# Patient Record
Sex: FEMALE | Race: WHITE | NOT HISPANIC OR LATINO | Employment: FULL TIME | RURAL
[De-identification: names, ages, dates, MRNs, and addresses within clinical notes are randomized per-mention and may not be internally consistent; named-entity substitution may affect disease eponyms.]

---

## 2024-03-12 ENCOUNTER — OFFICE VISIT (OUTPATIENT)
Dept: FAMILY MEDICINE CLINIC | Facility: CLINIC | Age: 24
End: 2024-03-12
Payer: COMMERCIAL

## 2024-03-12 VITALS
WEIGHT: 270.4 LBS | HEART RATE: 96 BPM | BODY MASS INDEX: 46.16 KG/M2 | OXYGEN SATURATION: 98 % | HEIGHT: 64 IN | SYSTOLIC BLOOD PRESSURE: 126 MMHG | DIASTOLIC BLOOD PRESSURE: 86 MMHG

## 2024-03-12 DIAGNOSIS — Z13.1 SCREENING FOR DIABETES MELLITUS (DM): ICD-10-CM

## 2024-03-12 DIAGNOSIS — B37.2 YEAST DERMATITIS: ICD-10-CM

## 2024-03-12 DIAGNOSIS — Z13.220 SCREENING FOR LIPID DISORDERS: ICD-10-CM

## 2024-03-12 DIAGNOSIS — N62 LARGE BREASTS: ICD-10-CM

## 2024-03-12 DIAGNOSIS — G56.03 BILATERAL CARPAL TUNNEL SYNDROME: ICD-10-CM

## 2024-03-12 DIAGNOSIS — J45.20 MILD INTERMITTENT ASTHMA WITHOUT COMPLICATION: ICD-10-CM

## 2024-03-12 DIAGNOSIS — Z00.00 ANNUAL PHYSICAL EXAM: Primary | ICD-10-CM

## 2024-03-12 DIAGNOSIS — Z13.21 ENCOUNTER FOR VITAMIN DEFICIENCY SCREENING: ICD-10-CM

## 2024-03-12 RX ORDER — NYSTATIN 100000 [USP'U]/G
POWDER TOPICAL 3 TIMES DAILY
Qty: 60 G | Refills: 2 | Status: SHIPPED | OUTPATIENT
Start: 2024-03-12

## 2024-03-12 RX ORDER — ALBUTEROL SULFATE 90 UG/1
2 AEROSOL, METERED RESPIRATORY (INHALATION) EVERY 4 HOURS PRN
Qty: 18 G | Refills: 1 | Status: SHIPPED | OUTPATIENT
Start: 2024-03-12

## 2024-03-12 RX ORDER — NYSTATIN 100000 U/G
1 CREAM TOPICAL 2 TIMES DAILY
Qty: 30 G | Refills: 3 | Status: SHIPPED | OUTPATIENT
Start: 2024-03-12

## 2024-03-12 RX ORDER — NORGESTIMATE AND ETHINYL ESTRADIOL 0.25-0.035
1 KIT ORAL DAILY
COMMUNITY
Start: 2023-11-20

## 2024-03-12 NOTE — PROGRESS NOTES
"    Annual Physical     Name: Michaelle Bautista    : 2000     MRN: [id number]     Chief Complaint  Back Pain (Pt sttaes back pain going on since last week, She had hit her lower back on a window and had casued a knot. Pt is wanting to get it checked out to make sure everything is fine), Breast Problem (Pt is wanting to look into breast reduction ), Hand Pain (Pt has carpal tunnel in her right hand and occasionally left. Pt states it wakes her up at night from pain as well as hurting while driving. Pt states its gotten worse in past months ), and Asthma (Pt is needing refill on inhaler )    Subjective     History of Present Illness:  Michaelle Bautista is a 23 y.o. female who presents today for a health maintenance evaluation.    Social History  Tobacco Use: Low Risk  (3/12/2024)    Patient History     Smoking Tobacco Use: Never     Smokeless Tobacco Use: Never     Passive Exposure: Not on file     Social History     Socioeconomic History    Marital status:    Tobacco Use    Smoking status: Never    Smokeless tobacco: Never   Vaping Use    Vaping status: Never Used   Substance and Sexual Activity    Alcohol use: Yes     Alcohol/week: 1.0 - 2.0 standard drink of alcohol     Types: 1 - 2 Drinks containing 0.5 oz of alcohol per week    Drug use: Never    Sexual activity: Yes     Partners: Male     Birth control/protection: Condom, Birth control pill       General History  Michaelle  does have regular dental visits.  She does not complain of vision problems. Last eye exam was . Currently wearing glasses  Immunizations are not up to date. The patient needs the following immunizations: COVID and Influenza - declines today    Lifestyle  Michaelle  consumes in general, a \"healthy\" diet    She exercises rarely. Does play outside with her  students    Reproductive Health  Michaelle  is premenopausal.  She reports periods are irregular, she is on OCPs, has not had a period since December. She took a pregnancy test this " morning and it was negative  She is sexually active. Her contraceptive plan is oral contraceptives (estrogen/progesterone).    Screening  Last pap was November 2023  Last Completed Pap Smear       This patient has no relevant Health Maintenance data.        . History of abnormal pap smear or family history of gyn cancer: none    Last mammogram was never  Last Completed Mammogram       This patient has no relevant Health Maintenance data.        . Personal or family history of abnormal mammograms or breast cancer: maternal great aunt passed away with breast CA, no other fam hx    Last colonoscopy was never  Last Completed Colonoscopy       This patient has no relevant Health Maintenance data.        . Family history of colon cancer: none    Last DEXA was never.    Health Maintenance Summary            Overdue - BMI FOLLOWUP (Yearly) Never done      No completion, postpone, or frequency change history exists for this topic.              Overdue - Pneumococcal Vaccine 0-64 (1 of 2 - PCV) Never done      No completion, postpone, or frequency change history exists for this topic.              Overdue - HPV VACCINES (1 - 3-dose series) Never done      No completion, postpone, or frequency change history exists for this topic.              Overdue - TDAP/TD VACCINES (1 - Tdap) Never done      No completion, postpone, or frequency change history exists for this topic.              Overdue - INFLUENZA VACCINE (Yearly - August to March) Never done      No completion, postpone, or frequency change history exists for this topic.              Overdue - COVID-19 Vaccine (1 - 2023-24 season) Never done      No completion, postpone, or frequency change history exists for this topic.              Overdue - HEPATITIS C SCREENING (Once) Never done      No completion, postpone, or frequency change history exists for this topic.              Overdue - ANNUAL PHYSICAL (Yearly) Never done      No completion, postpone, or frequency change  "history exists for this topic.              Overdue - CHLAMYDIA SCREENING (Yearly) Never done      No completion, postpone, or frequency change history exists for this topic.              Overdue - PAP SMEAR (Every 3 Years) Never done      No completion, postpone, or frequency change history exists for this topic.              MENINGOCOCCAL VACCINE (Series Information) Aged Out      No completion, postpone, or frequency change history exists for this topic.                    There is no immunization history on file for this patient.    Review of Systems    Objective     Vital Signs  /86 (BP Location: Left arm, Patient Position: Sitting, Cuff Size: Adult)   Pulse 96   Ht 162.6 cm (64\")   Wt 123 kg (270 lb 6.4 oz)   SpO2 98%   BMI 46.41 kg/m²   Estimated body mass index is 46.41 kg/m² as calculated from the following:    Height as of this encounter: 162.6 cm (64\").    Weight as of this encounter: 123 kg (270 lb 6.4 oz).    Class 3 Severe Obesity (BMI >=40). Obesity-related health conditions include the following: none. Obesity is unchanged. BMI is is above average; BMI management plan is completed. We discussed portion control and increasing exercise.      Physical Exam  Vitals reviewed.   Constitutional:       Appearance: Normal appearance.   HENT:      Head: Normocephalic.      Right Ear: Tympanic membrane, ear canal and external ear normal.      Left Ear: Tympanic membrane, ear canal and external ear normal.      Nose: Nose normal.      Mouth/Throat:      Mouth: Mucous membranes are moist.      Pharynx: Oropharynx is clear.   Eyes:      Extraocular Movements: Extraocular movements intact.      Pupils: Pupils are equal, round, and reactive to light.   Cardiovascular:      Rate and Rhythm: Normal rate and regular rhythm.      Heart sounds: Normal heart sounds.   Pulmonary:      Effort: Pulmonary effort is normal.      Breath sounds: Normal breath sounds.   Chest:      Comments: Patient noted to have " excessively large breasts  Abdominal:      General: Bowel sounds are normal.      Palpations: Abdomen is soft.   Musculoskeletal:         General: Normal range of motion.      Cervical back: Normal range of motion.   Skin:     General: Skin is warm and dry.      Capillary Refill: Capillary refill takes less than 2 seconds.   Neurological:      General: No focal deficit present.      Mental Status: She is alert and oriented to person, place, and time.   Psychiatric:         Mood and Affect: Mood normal.         Behavior: Behavior normal.          Assessment and Plan     Diagnoses and all orders for this visit:    1. Annual physical exam (Primary)  Assessment & Plan:  Overall doing well  , no children yet  Works in [place] as a     Orders:  -     CBC & Differential  -     Comprehensive Metabolic Panel  -     Hemoglobin A1c  -     Lipid Panel  -     TSH  -     Vitamin B12  -     Vitamin D,25-Hydroxy  -     Folate  -     T4, free    2. Mild intermittent asthma without complication  Assessment & Plan:  Reports she is stable, in need of albuterol inhaler refill.    Orders:  -     albuterol sulfate  (90 Base) MCG/ACT inhaler; Inhale 2 puffs Every 4 (Four) Hours As Needed for Shortness of Air.  Dispense: 18 g; Refill: 1    3. Bilateral carpal tunnel syndrome  Assessment & Plan:  Patient reports she has been having carpal tunnel symptoms for about a year. She reports symptoms occur more frequently in right hand compared to left, but definitely happens in both hands. She has a cockup brace that she wears at night intermittently but reports it only helps a little bit. Otherwise she takes no medications for symptom relief.    We discussed treatment options. Encouraged getting a new set of cockup wrist splints to be worn consistently. Also discussed and encouraged ice and NSAID use as needed. Will follow for now, may require specialist evaluation if not improving      4. Large breasts  Assessment  "& Plan:  Patient reports she has had large breasts since she went through puberty. She isn't even sure what cup size she is. She complains of constant back pain, difficulty leaning forward to due anything due to pain and pulling sensation she experiences in her back due to weight of breasts. She reports she gets recurrent rashes underneath her breasts from heat and sweatiness - has pictures for proof.     She is interested in speaking with a surgeon about possible breast reduction surgery.     Orders:  -     Ambulatory Referral to General Surgery    5. Yeast dermatitis  Assessment & Plan:  Patient reports she has recurrent, red, itchy rash under her breasts. She reports area stays moist due to size of breasts. She reports the rash is occasionally itchy but then burns when its touched. She tries to keep the area as dry as possible. Doesn't use any preventative medications, uses \"random creams\" when she has the rash.    Orders:  -     nystatin (MYCOSTATIN) 537708 UNIT/GM powder; Apply  topically to the appropriate area as directed 3 (Three) Times a Day.  Dispense: 60 g; Refill: 2  -     nystatin (MYCOSTATIN) 882625 UNIT/GM cream; Apply 1 Application topically to the appropriate area as directed 2 (Two) Times a Day.  Dispense: 30 g; Refill: 3    6. Screening for lipid disorders  Assessment & Plan:  Labs drawn today    Orders:  -     Lipid Panel    7. Screening for diabetes mellitus (DM)  Assessment & Plan:  Labs drawn today    Orders:  -     Comprehensive Metabolic Panel  -     Hemoglobin A1c    8. Encounter for vitamin deficiency screening  Assessment & Plan:  Labs drawn today    Orders:  -     TSH  -     Vitamin B12  -     Vitamin D,25-Hydroxy        Follow Up  Return in about 1 year (around 3/12/2025) for Annual physical.    Aida Verdugo, KHUSHBU  "

## 2024-03-12 NOTE — ASSESSMENT & PLAN NOTE
Patient reports she has been having carpal tunnel symptoms for about a year. She reports symptoms occur more frequently in right hand compared to left, but definitely happens in both hands. She has a cockup brace that she wears at night intermittently but reports it only helps a little bit. Otherwise she takes no medications for symptom relief.    We discussed treatment options. Encouraged getting a new set of cockup wrist splints to be worn consistently. Also discussed and encouraged ice and NSAID use as needed. Will follow for now, may require specialist evaluation if not improving

## 2024-03-12 NOTE — ASSESSMENT & PLAN NOTE
Patient reports she has had large breasts since she went through puberty. She isn't even sure what cup size she is. She complains of constant back pain, difficulty leaning forward to due anything due to pain and pulling sensation she experiences in her back due to weight of breasts. She reports she gets recurrent rashes underneath her breasts from heat and sweatiness - has pictures for proof.     She is interested in speaking with a surgeon about possible breast reduction surgery.

## 2024-03-13 LAB
25(OH)D3+25(OH)D2 SERPL-MCNC: 11 NG/ML (ref 30–100)
ALBUMIN SERPL-MCNC: 4.5 G/DL (ref 4–5)
ALBUMIN/GLOB SERPL: 1.7 {RATIO} (ref 1.2–2.2)
ALP SERPL-CCNC: 78 IU/L (ref 44–121)
ALT SERPL-CCNC: 24 IU/L (ref 0–32)
AST SERPL-CCNC: 21 IU/L (ref 0–40)
BASOPHILS # BLD AUTO: 0 X10E3/UL (ref 0–0.2)
BASOPHILS NFR BLD AUTO: 1 %
BILIRUB SERPL-MCNC: 0.4 MG/DL (ref 0–1.2)
BUN SERPL-MCNC: 9 MG/DL (ref 6–20)
BUN/CREAT SERPL: 13 (ref 9–23)
CALCIUM SERPL-MCNC: 9.7 MG/DL (ref 8.7–10.2)
CHLORIDE SERPL-SCNC: 102 MMOL/L (ref 96–106)
CHOLEST SERPL-MCNC: 144 MG/DL (ref 100–199)
CO2 SERPL-SCNC: 22 MMOL/L (ref 20–29)
CREAT SERPL-MCNC: 0.72 MG/DL (ref 0.57–1)
EGFRCR SERPLBLD CKD-EPI 2021: 122 ML/MIN/1.73
EOSINOPHIL # BLD AUTO: 0.1 X10E3/UL (ref 0–0.4)
EOSINOPHIL NFR BLD AUTO: 1 %
ERYTHROCYTE [DISTWIDTH] IN BLOOD BY AUTOMATED COUNT: 13.8 % (ref 11.7–15.4)
FOLATE SERPL-MCNC: 9.3 NG/ML
GLOBULIN SER CALC-MCNC: 2.7 G/DL (ref 1.5–4.5)
GLUCOSE SERPL-MCNC: 80 MG/DL (ref 70–99)
HBA1C MFR BLD: 5.4 % (ref 4.8–5.6)
HCT VFR BLD AUTO: 38.7 % (ref 34–46.6)
HDLC SERPL-MCNC: 37 MG/DL
HGB BLD-MCNC: 13 G/DL (ref 11.1–15.9)
IMM GRANULOCYTES # BLD AUTO: 0 X10E3/UL (ref 0–0.1)
IMM GRANULOCYTES NFR BLD AUTO: 0 %
LDLC SERPL CALC-MCNC: 92 MG/DL (ref 0–99)
LYMPHOCYTES # BLD AUTO: 2.1 X10E3/UL (ref 0.7–3.1)
LYMPHOCYTES NFR BLD AUTO: 25 %
MCH RBC QN AUTO: 29.1 PG (ref 26.6–33)
MCHC RBC AUTO-ENTMCNC: 33.6 G/DL (ref 31.5–35.7)
MCV RBC AUTO: 87 FL (ref 79–97)
MONOCYTES # BLD AUTO: 0.5 X10E3/UL (ref 0.1–0.9)
MONOCYTES NFR BLD AUTO: 6 %
NEUTROPHILS # BLD AUTO: 5.7 X10E3/UL (ref 1.4–7)
NEUTROPHILS NFR BLD AUTO: 67 %
PLATELET # BLD AUTO: 300 X10E3/UL (ref 150–450)
POTASSIUM SERPL-SCNC: 4.4 MMOL/L (ref 3.5–5.2)
PROT SERPL-MCNC: 7.2 G/DL (ref 6–8.5)
RBC # BLD AUTO: 4.46 X10E6/UL (ref 3.77–5.28)
SODIUM SERPL-SCNC: 140 MMOL/L (ref 134–144)
T4 FREE SERPL-MCNC: 1.06 NG/DL (ref 0.82–1.77)
TRIGL SERPL-MCNC: 74 MG/DL (ref 0–149)
TSH SERPL DL<=0.005 MIU/L-ACNC: 1.52 UIU/ML (ref 0.45–4.5)
VIT B12 SERPL-MCNC: 490 PG/ML (ref 232–1245)
VLDLC SERPL CALC-MCNC: 15 MG/DL (ref 5–40)
WBC # BLD AUTO: 8.5 X10E3/UL (ref 3.4–10.8)

## 2024-03-14 ENCOUNTER — TELEPHONE (OUTPATIENT)
Dept: FAMILY MEDICINE CLINIC | Facility: CLINIC | Age: 24
End: 2024-03-14
Payer: COMMERCIAL

## 2024-03-14 NOTE — ASSESSMENT & PLAN NOTE
"Patient reports she has recurrent, red, itchy rash under her breasts. She reports area stays moist due to size of breasts. She reports the rash is occasionally itchy but then burns when its touched. She tries to keep the area as dry as possible. Doesn't use any preventative medications, uses \"random creams\" when she has the rash.  "

## 2024-03-14 NOTE — TELEPHONE ENCOUNTER
"Relay     \"Dr GRACE does not see for breast reductions. Would need to go to [facility] and wanted to know if that was ok?\"                "

## 2024-03-15 ENCOUNTER — TELEPHONE (OUTPATIENT)
Dept: FAMILY MEDICINE CLINIC | Facility: CLINIC | Age: 24
End: 2024-03-15
Payer: COMMERCIAL

## 2024-03-15 DIAGNOSIS — E55.9 VITAMIN D DEFICIENCY: Primary | ICD-10-CM

## 2024-03-15 NOTE — TELEPHONE ENCOUNTER
" Hub to relay   Please let Cheli know we got her lab results back.     Her cholesterol levels overall look good. Her \"good\" cholesterol is a little low at 37, we like to see this be 40 or higher. I would recommend that she include some healthy fats into her diet - olive oil, avocado, whole grains, nuts, seeds, etc. Exercising is also a huge help with this.     Her vitamin D is pretty low - I've sent a Rx to her pharmacy for a supplement.     The rest of her labs look great! Blood counts, electrolytes, liver, kidneys, thyroid, HgA1c, B12, and folate are all normal  "

## 2024-04-24 ENCOUNTER — OFFICE VISIT (OUTPATIENT)
Dept: FAMILY MEDICINE CLINIC | Facility: CLINIC | Age: 24
End: 2024-04-24
Payer: COMMERCIAL

## 2024-04-24 VITALS
TEMPERATURE: 98.7 F | WEIGHT: 271 LBS | OXYGEN SATURATION: 98 % | HEIGHT: 64 IN | BODY MASS INDEX: 46.26 KG/M2 | DIASTOLIC BLOOD PRESSURE: 82 MMHG | SYSTOLIC BLOOD PRESSURE: 120 MMHG | HEART RATE: 89 BPM

## 2024-04-24 DIAGNOSIS — J30.89 ENVIRONMENTAL AND SEASONAL ALLERGIES: Primary | ICD-10-CM

## 2024-04-24 DIAGNOSIS — R09.81 NASAL CONGESTION: ICD-10-CM

## 2024-04-24 DIAGNOSIS — J02.9 SORE THROAT: ICD-10-CM

## 2024-04-24 LAB
EXPIRATION DATE: NORMAL
EXPIRATION DATE: NORMAL
FLUAV AG UPPER RESP QL IA.RAPID: NOT DETECTED
FLUBV AG UPPER RESP QL IA.RAPID: NOT DETECTED
INTERNAL CONTROL: NORMAL
INTERNAL CONTROL: NORMAL
Lab: NORMAL
Lab: NORMAL
S PYO AG THROAT QL: NEGATIVE
SARS-COV-2 AG UPPER RESP QL IA.RAPID: NOT DETECTED

## 2024-04-24 PROCEDURE — 87428 SARSCOV & INF VIR A&B AG IA: CPT | Performed by: NURSE PRACTITIONER

## 2024-04-24 PROCEDURE — 99213 OFFICE O/P EST LOW 20 MIN: CPT | Performed by: NURSE PRACTITIONER

## 2024-04-24 PROCEDURE — 87880 STREP A ASSAY W/OPTIC: CPT | Performed by: NURSE PRACTITIONER

## 2024-04-24 RX ORDER — LORATADINE 10 MG/1
10 TABLET ORAL DAILY
Qty: 30 TABLET | Refills: 2 | Status: SHIPPED | OUTPATIENT
Start: 2024-04-24

## 2024-04-24 RX ORDER — AZELASTINE 1 MG/ML
2 SPRAY, METERED NASAL 2 TIMES DAILY
Qty: 30 ML | Refills: 1 | Status: SHIPPED | OUTPATIENT
Start: 2024-04-24

## 2024-04-24 NOTE — PROGRESS NOTES
"    Office Note     Name: Michaelle Bautista    : 2000     MRN: [id number]     Chief Complaint  Sore Throat (Pt has had a sore throat over a couple days. Had been near someone with strep )    Subjective     History of Present Illness:  Michaelle Bautista is a 23 y.o. female who presents today for sore throat and nasal congestion since yesterday.       Objective     Past Medical History:   Diagnosis Date    ADHD (attention deficit hyperactivity disorder)     Asthma      History reviewed. No pertinent surgical history.  History reviewed. No pertinent family history.    Vital Signs  /82 (BP Location: Left arm, Patient Position: Sitting, Cuff Size: Adult)   Pulse 89   Temp 98.7 °F (37.1 °C) (Oral)   Ht 162.6 cm (64\")   Wt 123 kg (271 lb)   SpO2 98%   BMI 46.52 kg/m²   Estimated body mass index is 46.52 kg/m² as calculated from the following:    Height as of this encounter: 162.6 cm (64\").    Weight as of this encounter: 123 kg (271 lb).    Physical Exam  Vitals reviewed.   Constitutional:       Appearance: Normal appearance.   HENT:      Head: Normocephalic.      Right Ear: Tympanic membrane, ear canal and external ear normal.      Left Ear: Tympanic membrane, ear canal and external ear normal.      Nose: Congestion present.      Mouth/Throat:      Mouth: Mucous membranes are moist.      Pharynx: Posterior oropharyngeal erythema (very minimal erythema, no exudate, no vesicles) present.   Cardiovascular:      Rate and Rhythm: Normal rate and regular rhythm.      Heart sounds: Normal heart sounds.   Pulmonary:      Effort: Pulmonary effort is normal.      Breath sounds: Normal breath sounds.   Skin:     General: Skin is warm and dry.      Capillary Refill: Capillary refill takes less than 2 seconds.   Neurological:      General: No focal deficit present.      Mental Status: She is alert and oriented to person, place, and time.   Psychiatric:         Mood and Affect: Mood normal.         Behavior: Behavior " normal.          POCT Results (if applicable):  Results for orders placed or performed in visit on 04/24/24   POCT SARS-CoV-2 Antigen FABIANA + Flu    Specimen: Swab   Result Value Ref Range    SARS Antigen Not Detected Not Detected, Presumptive Negative    Influenza A Antigen FABIANA Not Detected Not Detected    Influenza B Antigen FABIANA Not Detected Not Detected    Internal Control Passed Passed    Lot Number [id number]     Expiration Date 02/05/2025    POCT rapid strep A    Specimen: Swab   Result Value Ref Range    Rapid Strep A Screen Negative Negative, VALID, INVALID, Not Performed    Internal Control Passed Passed    Lot Number [id number]     Expiration Date 10/01/2026             Assessment and Plan     Diagnoses and all orders for this visit:    1. Environmental and seasonal allergies (Primary)  Assessment & Plan:  COVID/Influenza/Strep tests all negative.     Symptoms consistent with and physical exam confirms allergy symptoms. We discussed treatment options, risks/benefits, and possible side effects. Will initiate Claritin and Astelin nasal spray and monitor for effect. Encouraged patient to return to the office if symptoms worsen or change.     Orders:  -     loratadine (Claritin) 10 MG tablet; Take 1 tablet by mouth Daily.  Dispense: 30 tablet; Refill: 2  -     azelastine (ASTELIN) 0.1 % nasal spray; 2 sprays into the nostril(s) as directed by provider 2 (Two) Times a Day. Use in each nostril as directed  Dispense: 30 mL; Refill: 1    2. Sore throat  Assessment & Plan:  Patient reports she has had nasal congestion and sore throat since yesterday. She denies cough, headache, fever, abdominal pain, N/V/D. She has not taken any medications to help with her symptoms. She has been exposed to strep.     Orders:  -     POCT SARS-CoV-2 Antigen FABIANA + Flu  -     POCT rapid strep A    3. Nasal congestion  Assessment & Plan:  Patient reports she has had nasal congestion and sore throat since yesterday. She denies cough,  headache, fever, abdominal pain, N/V/D. She has not taken any medications to help with her symptoms. She has been exposed to strep.            Follow Up  Return if symptoms worsen or fail to improve.    KHUSHBU Herrmann

## 2024-04-24 NOTE — ASSESSMENT & PLAN NOTE
COVID/Influenza/Strep tests all negative.     Symptoms consistent with and physical exam confirms allergy symptoms. We discussed treatment options, risks/benefits, and possible side effects. Will initiate Claritin and Astelin nasal spray and monitor for effect. Encouraged patient to return to the office if symptoms worsen or change.

## 2024-04-24 NOTE — ASSESSMENT & PLAN NOTE
Patient reports she has had nasal congestion and sore throat since yesterday. She denies cough, headache, fever, abdominal pain, N/V/D. She has not taken any medications to help with her symptoms. She has been exposed to strep.

## 2024-05-03 ENCOUNTER — TELEPHONE (OUTPATIENT)
Dept: FAMILY MEDICINE CLINIC | Facility: CLINIC | Age: 24
End: 2024-05-03
Payer: COMMERCIAL

## 2024-05-03 NOTE — TELEPHONE ENCOUNTER
Caller: Michaelle Bautista    Relationship: Self    Best call back number: [phone]    What form or medical record are you requesting: PT STATED THAT SHE IS HAVING BREAST REDUCTION AND WAS TOLD THAT SHE WILL NEED A LETTER FROM  STATING THAT REASONS FOR HER MEDICATIONS THAT SHE IS TAKING    How would you like to receive the form or medical records (pick-up, mail, fax): WILL  FROM OFFICE    Timeframe paperwork needed: ASAP

## 2024-05-14 NOTE — TELEPHONE ENCOUNTER
LVM for Pt to call us back regarding letter she is needing   Sending letter to pt in order to see

## 2024-06-10 ENCOUNTER — OFFICE VISIT (OUTPATIENT)
Dept: FAMILY MEDICINE CLINIC | Facility: CLINIC | Age: 24
End: 2024-06-10
Payer: COMMERCIAL

## 2024-06-10 VITALS
SYSTOLIC BLOOD PRESSURE: 126 MMHG | WEIGHT: 271.3 LBS | HEIGHT: 64 IN | HEART RATE: 89 BPM | BODY MASS INDEX: 46.32 KG/M2 | OXYGEN SATURATION: 98 % | DIASTOLIC BLOOD PRESSURE: 80 MMHG

## 2024-06-10 DIAGNOSIS — N62 LARGE BREASTS: ICD-10-CM

## 2024-06-10 DIAGNOSIS — B37.2 YEAST DERMATITIS: Primary | ICD-10-CM

## 2024-06-10 PROCEDURE — 99213 OFFICE O/P EST LOW 20 MIN: CPT | Performed by: NURSE PRACTITIONER

## 2024-06-10 NOTE — ASSESSMENT & PLAN NOTE
Had consultation with surgeon for breast reduction surgery about a month ago. They are requesting a letter from PCP stating the medical need for her nystatin for insurance purposes.   Letter written and provided to patient today.  Surgery date is pending.

## 2024-06-10 NOTE — LETTER
Delores 10, 2024    Michaelle Bautista  [address] [facility], [place] [address]        To Whom It May Concern:     Cheli Bautista ( 2000) is an establish patient in this office, and I am her primary care provider. She is being evaluated for breast reduction surgery and has completed a consultation with a surgeon. Cheli has excessively large breasts, currently wearing an F cup. She has associated neck and back pain due to the size and weight of her breasts. Her largest complaint, however, is the rashes that she tends to keep under her breasts.     She has been evaluated multiple times in this clinic and urgent care facilities due to these rashes. She has been found to have yeast dermatitis as a result of her breasts creating a warm, dark, and moist environment for yeast to thrive, despite her multiple attempts to keep the area clean and dry. She has used Nystatin powder for prevention, as well as using various cloths tucked under her breasts to keep the area dry. She has used Nystatin and Clotrimazole creams for treatment of her rash, neither of which have been 100% effective at treating due to the constant skin-on-skin contact with her breasts and abdomen.     Her breast size and recurrent rashes are causing a decline in her quality of life, and it is my professional opinion that she is a great candidate for breast reduction surgery.     For any further questions or concerns, please feel free to contact the office.     Thank you,           KHUSHBU Herrmann

## 2024-06-10 NOTE — ASSESSMENT & PLAN NOTE
Patient reports she has recurrent, red, itchy rash under her breasts. She reports area stays moist due to size of breasts. She reports the rash is occasionally itchy but then burns when its touched. She tries to keep the area as dry as possible, which is extremely difficult. She has been prescribed Nystatin powder for prevention and Nystatin cream for treatment of yeast dermatitis, this has been intermittently effective.

## 2024-06-13 NOTE — PROGRESS NOTES
"    Office Note     Name: Michaelle Bautista    : 2000     MRN: [id number]     Chief Complaint  Breast Problem (Pt is needing a letter stating why sh takes her medications in order to schedule breast reduction surgery )    Subjective     History of Present Illness:  Michaelle Bautista is a 23 y.o. female who presents today for letter explaining medical necessity for antifungal powder and cream.  Patient has had consultation with surgeon for breast reduction surgery.      Objective     Past Medical History:   Diagnosis Date    ADHD (attention deficit hyperactivity disorder)     Asthma      History reviewed. No pertinent surgical history.  History reviewed. No pertinent family history.    Vital Signs  /80 (BP Location: Left arm, Patient Position: Sitting)   Pulse 89   Ht 162.6 cm (64\")   Wt 123 kg (271 lb 4.8 oz)   SpO2 98%   BMI 46.57 kg/m²   Estimated body mass index is 46.57 kg/m² as calculated from the following:    Height as of this encounter: 162.6 cm (64\").    Weight as of this encounter: 123 kg (271 lb 4.8 oz).    Physical Exam  Vitals reviewed.   Constitutional:       Appearance: Normal appearance.   Cardiovascular:      Rate and Rhythm: Normal rate and regular rhythm.      Heart sounds: Normal heart sounds.   Pulmonary:      Effort: Pulmonary effort is normal.      Breath sounds: Normal breath sounds.   Skin:     General: Skin is warm and dry.      Capillary Refill: Capillary refill takes less than 2 seconds.   Neurological:      General: No focal deficit present.      Mental Status: She is alert and oriented to person, place, and time.   Psychiatric:         Mood and Affect: Mood normal.         Behavior: Behavior normal.           Assessment and Plan     Diagnoses and all orders for this visit:    1. Yeast dermatitis (Primary)  Assessment & Plan:  Patient reports she has recurrent, red, itchy rash under her breasts. She reports area stays moist due to size of breasts. She reports the rash is " occasionally itchy but then burns when its touched. She tries to keep the area as dry as possible, which is extremely difficult. She has been prescribed Nystatin powder for prevention and Nystatin cream for treatment of yeast dermatitis, this has been intermittently effective.       2. Large breasts  Assessment & Plan:  Had consultation with surgeon for breast reduction surgery about a month ago. They are requesting a letter from PCP stating the medical need for her nystatin for insurance purposes.   Letter written and provided to patient today.  Surgery date is pending.            Follow Up  Return for Next scheduled follow up.    KHUSHBU Herrmann

## 2025-01-16 ENCOUNTER — OFFICE VISIT (OUTPATIENT)
Dept: FAMILY MEDICINE CLINIC | Facility: CLINIC | Age: 25
End: 2025-01-16
Payer: COMMERCIAL

## 2025-01-16 VITALS
OXYGEN SATURATION: 98 % | DIASTOLIC BLOOD PRESSURE: 80 MMHG | SYSTOLIC BLOOD PRESSURE: 128 MMHG | WEIGHT: 260 LBS | HEART RATE: 83 BPM | HEIGHT: 64 IN | BODY MASS INDEX: 44.39 KG/M2

## 2025-01-16 DIAGNOSIS — M54.2 CHRONIC NECK PAIN: Primary | ICD-10-CM

## 2025-01-16 DIAGNOSIS — M79.672 HEEL PAIN, BILATERAL: ICD-10-CM

## 2025-01-16 DIAGNOSIS — G89.29 CHRONIC RIGHT SHOULDER PAIN: ICD-10-CM

## 2025-01-16 DIAGNOSIS — M79.671 HEEL PAIN, BILATERAL: ICD-10-CM

## 2025-01-16 DIAGNOSIS — G89.29 CHRONIC NECK PAIN: Primary | ICD-10-CM

## 2025-01-16 DIAGNOSIS — M79.671 BILATERAL FOOT PAIN: ICD-10-CM

## 2025-01-16 DIAGNOSIS — G89.29 CHRONIC BILATERAL THORACIC BACK PAIN: ICD-10-CM

## 2025-01-16 DIAGNOSIS — M54.6 CHRONIC BILATERAL THORACIC BACK PAIN: ICD-10-CM

## 2025-01-16 DIAGNOSIS — R06.83 LOUD SNORING: ICD-10-CM

## 2025-01-16 DIAGNOSIS — G47.30 SLEEP APNEA, UNSPECIFIED TYPE: ICD-10-CM

## 2025-01-16 DIAGNOSIS — M79.672 BILATERAL FOOT PAIN: ICD-10-CM

## 2025-01-16 DIAGNOSIS — N62 LARGE BREASTS: ICD-10-CM

## 2025-01-16 DIAGNOSIS — M25.511 CHRONIC RIGHT SHOULDER PAIN: ICD-10-CM

## 2025-01-16 PROCEDURE — 99214 OFFICE O/P EST MOD 30 MIN: CPT | Performed by: NURSE PRACTITIONER

## 2025-01-16 NOTE — PROGRESS NOTES
"    Office Note     Name: Michaelle Bautista    : 2000     MRN: [id number]     Chief Complaint  Back Pain, Pain (Pt states she has been having feet pain since high school ), Shoulder Pain (Pt states the last month her shoulder have been hurting ), and Snoring (Pt states her  has been noticing her snore loud at night and almost stop breathing )    Subjective     History of Present Illness:  Michaelle Bautista is a 24 y.o. female who presents today for multiple concerns.     History of Present Illness  The patient presents for evaluation of shoulder pain, back pain, foot pain, and suspected sleep apnea.    She reports experiencing severe pain in her right shoulder, which she describes as muscular in nature and accompanied by a stinging sensation. The pain is constant and has been progressively worsening. She has not sought relief through massage therapy or chiropractic adjustments. She does not experience any numbness or tingling in her arms or hands, except for symptoms related to carpal tunnel syndrome. Despite the pain, she retains mobility in her arm and shoulder. She has attempted to alleviate the discomfort with a \"massage gun\", heat, ice, and rest, but these measures only provide temporary relief for a few hours before the pain returns with full intensity.    She has been experiencing foot pain since high school, which she describes as particularly severe in the heels after a 7-hour work shift, necessitating immediate rest upon returning home. The pain is so intense that it impedes her ability to walk. She also reports occasional pain in the front part of her foot, although it is less frequent and severe than the heel pain. She has not undergone any radiographic examinations or surgical interventions for her feet. She typically wears tennis shoes with memory foam inserts for work and has observed that the middle part of the shoe (\"ball of the foot\") tends to wear out more quickly than the rest of the sole. " "She has tried hot baths and Epsom salt, which provide temporary relief, but the pain eventually returns with increased intensity.    She is concerned about potential sleep apnea, as her  has observed episodes of loud snoring and cessation of breathing during her sleep. Her mother has also reported similar observations.    She has been experiencing back pain, which she attributes to her large breasts. She had a consultation for breast reduction surgery in May 2024 but decided to postpone it until after having children due to concerns about breastfeeding. She has not had any x-rays of her neck or back.      Objective     Past Medical History:   Diagnosis Date    ADHD (attention deficit hyperactivity disorder)     Asthma      History reviewed. No pertinent surgical history.  History reviewed. No pertinent family history.    Vital Signs  /80 (BP Location: Left arm, Patient Position: Sitting)   Pulse 83   Ht 162.6 cm (64\")   Wt 118 kg (260 lb)   SpO2 98%   BMI 44.63 kg/m²   Estimated body mass index is 44.63 kg/m² as calculated from the following:    Height as of this encounter: 162.6 cm (64\").    Weight as of this encounter: 118 kg (260 lb).    Physical Exam  Vitals reviewed.   Constitutional:       Appearance: Normal appearance.   Cardiovascular:      Rate and Rhythm: Normal rate and regular rhythm.      Heart sounds: Normal heart sounds.   Pulmonary:      Effort: Pulmonary effort is normal.      Breath sounds: Normal breath sounds.   Musculoskeletal:      Right shoulder: Tenderness present. No swelling, bony tenderness or crepitus. Normal range of motion (but does c/o pain with ROM).      Left shoulder: No swelling, tenderness, bony tenderness or crepitus. Normal range of motion.        Arms:       Cervical back: No tenderness or bony tenderness. Normal range of motion.      Thoracic back: No tenderness or bony tenderness. Normal range of motion.   Skin:     General: Skin is warm and dry. " "  Neurological:      General: No focal deficit present.      Mental Status: She is alert and oriented to person, place, and time.   Psychiatric:         Mood and Affect: Mood normal.         Behavior: Behavior normal.        Physical Exam  Lungs were auscultated.    Results         Assessment and Plan     Diagnoses and all orders for this visit:    1. Chronic neck pain (Primary)  -     XR Spine Cervical 2 or 3 View    2. Chronic right shoulder pain  -     XR Shoulder 2+ View Right (In Office)    3. Chronic bilateral thoracic back pain  -     XR Spine Thoracic 2 View (In Office)    4. Large breasts  -     XR Spine Cervical 2 or 3 View  -     XR Spine Thoracic 2 View (In Office)  -     XR Shoulder 2+ View Right (In Office)    5. Loud snoring  -     Ambulatory Referral to Sleep Medicine    6. Sleep apnea, unspecified type  -     Ambulatory Referral to Sleep Medicine    7. Bilateral foot pain  -     XR Foot 3+ View Bilateral (In Office)    8. Heel pain, bilateral  -     XR Foot 3+ View Bilateral (In Office)      Assessment & Plan  1. Right shoulder pain.  The patient reports persistent right shoulder pain that feels like muscle pain and is accompanied by stiffness. She has tried a \"massage gun\", heat, ice, and resting, which provide only temporary relief. X-rays of the neck, thoracic spine, and shoulder will be ordered to rule out any alignment issues or bone spurs.    2. Back pain.  The patient has a history of thoracic back pain, which has been consistent and worsening. She previously considered breast reduction surgery due to the pain but decided to postpone it until after having children. X-rays of the neck and thoracic spine will be ordered to check for any underlying issues.    3. Foot pain.  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 She reports that the pain is severe enough to limit her mobility and that her heels become red. She occasionally " experiences pain in the front part of her foot but not as often or as severe as the heel pain. X-rays of both feet will be ordered to rule out heel spurs or other structural abnormalities.    4. Suspected sleep apnea.  The patient reports loud snoring and episodes of stopped breathing during sleep, as observed by her  and mother. A referral to a sleep specialist will be made for further evaluation and potential sleep study. The sleep study will likely be conducted at home, and the necessary equipment will be sent to her house.    Follow-up  The patient will follow up after 03/12/2025 for her physical examination.         Follow Up  Return in about 8 weeks (around 3/13/2025) for Annual physical with fasting labs.      Patient or patient representative verbalized consent for the use of Ambient Listening during the visit with  KHUSHBU Herrmann for chart documentation. 1/19/2025  13:32 EST    KHUSHBU Herrmann

## 2025-02-06 ENCOUNTER — OFFICE VISIT (OUTPATIENT)
Age: 25
End: 2025-02-06
Payer: COMMERCIAL

## 2025-02-06 ENCOUNTER — PATIENT ROUNDING (BHMG ONLY) (OUTPATIENT)
Dept: CARDIOLOGY | Facility: CLINIC | Age: 25
End: 2025-02-06
Payer: COMMERCIAL

## 2025-02-06 VITALS
DIASTOLIC BLOOD PRESSURE: 78 MMHG | BODY MASS INDEX: 44.61 KG/M2 | HEART RATE: 85 BPM | HEIGHT: 64 IN | SYSTOLIC BLOOD PRESSURE: 124 MMHG | WEIGHT: 261.3 LBS | OXYGEN SATURATION: 99 %

## 2025-02-06 DIAGNOSIS — F51.01 PRIMARY INSOMNIA: ICD-10-CM

## 2025-02-06 DIAGNOSIS — G47.10 HYPERSOMNIA: ICD-10-CM

## 2025-02-06 DIAGNOSIS — R06.83 SNORING: ICD-10-CM

## 2025-02-06 PROBLEM — J45.909 ASTHMA: Status: ACTIVE | Noted: 2024-03-12

## 2025-02-06 PROBLEM — L70.0 SUPERFICIAL MIXED COMEDONAL AND INFLAMMATORY ACNE VULGARIS: Status: ACTIVE | Noted: 2018-04-13

## 2025-02-06 NOTE — PROGRESS NOTES
"    [facility] Provider Note     Date:   2025   Name: Michaelle Bautista  :   2000  PCP: Aida Verdugo APRN         ..Establish Care (NECK SIZE: 16.5\"/EPWORTH: /PT REPORTS THAT HER MOM AND BOYFRIEND TELL HER SHE SNORES AND HAVE WITNESSED APNEAS. )       History of Present Illness    Michaelle Bautista is a 24 y.o. female who presents today to establish sleep care for possible sleep apnea.  She was referred by her primary care provider, Aida Verdugo, and I appreciate the referral.  Patient is complaining of snoring and occasionally stops breathing in her sleep.  She said this happens every night.  She denies having had any accidents at work due to sleepiness and she denies ever having had any traffic accidents due to sleepiness.  Her  told her that she snores almost continuously every night and in all positions.  Her  also told her that she quits breathing at night.  She will sometimes have a morning sore throat.  She has had her weight has remained stable over the past 5 years.  She will sometimes have periods when she feels paralyzed while going to sleep or waking up.  She thinks that she frequently kicks and jerks her legs at night.  She has difficulty initiating sleep at night sometimes.  And she will talk in her sleep.  She goes to bed between 11 PM and 12 AM and gets up around 8 AM.  It can take her 1 to 2 hours to go to sleep and she gets up 1 time a night.    No tobacco history.  She only drinks alcohol about once every 2 months on special occasions.  She does drink 1 regular coffee a day and 1 regular soda a day.  No history of recreational drug use.    Neck size 16.5 inches and [place] 12.    The only time she has chest discomfort is when she is having a mild asthma exacerbation and uses albuterol inhaler and it resolves.  Otherwise no chest pain or shortness of air.  No edema.  No palpitations.    We will order a home sleep study and then go from " there.      Reviewed: PCP note, new patient packet, [place]    Cardiology and Sleep Related History:    No specialty comments available.    Problems Addressed this Visit       Hypersomnia     Check home sleep study         Relevant Orders    Home Sleep Study    Snoring     Check home sleep study         Relevant Orders    Home Sleep Study    Primary insomnia     Takes her 1-2 hours to fall asleep.  Recheck after HST.          Diagnoses         Codes Comments    Hypersomnia     ICD-10-CM: G47.10  ICD-9-CM: 780.54     Snoring     ICD-10-CM: R06.83  ICD-9-CM: 786.09     Primary insomnia     ICD-10-CM: F51.01  ICD-9-CM: 307.42              There are no discontinued medications.       No Known Allergies      Current Outpatient Medications:     albuterol sulfate  (90 Base) MCG/ACT inhaler, Inhale 2 puffs Every 4 (Four) Hours As Needed for Shortness of Air., Disp: 18 g, Rfl: 1    nystatin (MYCOSTATIN) 698413 UNIT/GM cream, Apply 1 Application topically to the appropriate area as directed 2 (Two) Times a Day., Disp: 30 g, Rfl: 3    nystatin (MYCOSTATIN) 311085 UNIT/GM powder, Apply  topically to the appropriate area as directed 3 (Three) Times a Day., Disp: 60 g, Rfl: 2    Past Medical History:   Diagnosis Date    ADHD (attention deficit hyperactivity disorder)     Asthma 3/12/2024          Patient Active Problem List   Diagnosis    Annual physical exam    Bilateral carpal tunnel syndrome    Encounter for vitamin deficiency screening    Screening for lipid disorders    Screening for diabetes mellitus (DM)    Large breasts    Yeast dermatitis    Environmental and seasonal allergies    Sore throat    Nasal congestion    Superficial mixed comedonal and inflammatory acne vulgaris    Asthma    Hypersomnia    Snoring    Primary insomnia        History reviewed. No pertinent family history.      family history is not on file.     Social History     Socioeconomic History    Marital status:    Tobacco Use    Smoking  "status: Never    Smokeless tobacco: Never   Vaping Use    Vaping status: Never Used   Substance and Sexual Activity    Alcohol use: Yes     Alcohol/week: 1.0 - 2.0 standard drink of alcohol     Types: 1 - 2 Drinks containing 0.5 oz of alcohol per week    Drug use: Never    Sexual activity: Yes     Partners: Male     Birth control/protection: Condom, Birth control pill             Vital Signs:  /78 (BP Location: Right arm, Patient Position: Sitting, Cuff Size: Adult)   Pulse 85   Ht 162.6 cm (64\")   Wt 119 kg (261 lb 4.8 oz)   SpO2 99%   BMI 44.85 kg/m²   Vitals:    02/06/25 1418   Patient Position: Sitting      Estimated body mass index is 44.85 kg/m² as calculated from the following:    Height as of this encounter: 162.6 cm (64\").    Weight as of this encounter: 119 kg (261 lb 4.8 oz).     Physical Exam  Vitals reviewed.   Constitutional:       General: She is not in acute distress.  HENT:      Head: Normocephalic.   Eyes:      General: No scleral icterus.  Cardiovascular:      Rate and Rhythm: Normal rate.   Pulmonary:      Effort: Pulmonary effort is normal.   Musculoskeletal:         General: Normal range of motion.      Cervical back: Normal range of motion.   Skin:     General: Skin is warm and dry.   Neurological:      Mental Status: She is alert and oriented to person, place, and time.   Psychiatric:         Mood and Affect: Mood normal.         Thought Content: Thought content normal.             Assessment and Plan     Diagnoses and all orders for this visit:    1. Hypersomnia  Assessment & Plan:  Check home sleep study    Orders:  -     Home Sleep Study; Future    2. Snoring  Assessment & Plan:  Check home sleep study    Orders:  -     Home Sleep Study; Future    3. Primary insomnia  Assessment & Plan:  Takes her 1-2 hours to fall asleep.  Recheck after HST.            Recommendations: ER if symptoms increase, Report if any new/changing symptoms immediately, and Sleep hygiene " discussed    Follow Up  Return for After HST .    Izabella Ramos, APRN   02/06/2025     Please note that this explicitly excludes time spent on other separate billable services such as performing procedures or test interpretation, when applicable.  This note was created using dictation software which occasionally transcribes nonsensical phrases. Please contact the provider if any clarification is needed.

## 2025-02-06 NOTE — PROGRESS NOTES
..My name is Janie Aguirre and I am the Practice Manager for [facility].    I would like to thank you for being a loyal patient. If you do not mind I would like to ask you a few questions about your recent visit with us.  Please feel free to reply if you wish to provide us with feedback on your first visit with our practice.    First, could you tell me what went well with your recent visit?    Secondly, we are always looking for ways to make our patients' experiences even better.  Do you have any recommendations on ways we may improve?    Finally, overall were you satisfied with your first visit to us as a [facility] facility?    In the next few days, you will be receiving a Patient Experience Survey.      Thank you for taking the time to answer a few questions today.  I hope you have a good day.

## 2025-02-20 DIAGNOSIS — G47.10 HYPERSOMNIA: ICD-10-CM

## 2025-02-20 DIAGNOSIS — R06.83 SNORING: ICD-10-CM
